# Patient Record
Sex: FEMALE | Race: WHITE | NOT HISPANIC OR LATINO | Employment: FULL TIME | URBAN - METROPOLITAN AREA
[De-identification: names, ages, dates, MRNs, and addresses within clinical notes are randomized per-mention and may not be internally consistent; named-entity substitution may affect disease eponyms.]

---

## 2017-09-07 ENCOUNTER — ALLSCRIPTS OFFICE VISIT (OUTPATIENT)
Dept: OTHER | Facility: OTHER | Age: 27
End: 2017-09-07

## 2017-09-07 PROCEDURE — G0145 SCR C/V CYTO,THINLAYER,RESCR: HCPCS | Performed by: FAMILY MEDICINE

## 2017-09-10 ENCOUNTER — LAB REQUISITION (OUTPATIENT)
Dept: LAB | Facility: HOSPITAL | Age: 27
End: 2017-09-10
Payer: COMMERCIAL

## 2017-09-10 DIAGNOSIS — Z11.3 ENCOUNTER FOR SCREENING FOR INFECTIONS WITH PREDOMINANTLY SEXUAL MODE OF TRANSMISSION: ICD-10-CM

## 2017-09-10 PROCEDURE — 87491 CHLMYD TRACH DNA AMP PROBE: CPT | Performed by: FAMILY MEDICINE

## 2017-09-10 PROCEDURE — 87591 N.GONORRHOEAE DNA AMP PROB: CPT | Performed by: FAMILY MEDICINE

## 2017-09-11 LAB
CHLAMYDIA DNA CVX QL NAA+PROBE: NORMAL
N GONORRHOEA DNA GENITAL QL NAA+PROBE: NORMAL

## 2017-09-13 LAB
LAB AP GYN PRIMARY INTERPRETATION: NORMAL
Lab: NORMAL
PATH INTERP SPEC-IMP: NORMAL

## 2018-01-12 NOTE — PROGRESS NOTES
Assessment    1  Visit for routine gyn exam (V72 31) (Z01 419)   2  Oral contraceptive prescribed (V25 01) (Z30 011)    Plan  Health Maintenance    · Begin or continue regular aerobic exercise  Gradually work up to at least 3 sessions of 30  minutes of exercise a week ; Status:Complete;   Done: [id number]   · We recommend that you bring your body mass index down to 26 ; Status:Complete;    Done: 81QRM3873    Check Pap smear, GC Chlamydia  Refill OC's  Discussion/Summary  Advice and education were given regarding aerobic exercise and calcium supplements  Gyn exam-Has a history of ASCUS on Pap  OPC's-tolerating well; wishes to continue  History of Present Illness  HM, Adult Female: The patient is being seen for a gynecology evaluation  General Health: The patient's health since the last visit is described as good  Lifestyle:  She consumes a diverse and healthy diet  (Eating pescatarian diet  Fish, seafood, fruits and vegetables  Dairy 3 daily  No caffeine but takes caffein epills  )   She has weight concerns  She exercises regularly  (Goes to the gym 3-5 times a week)   She does not use tobacco  She consumes alcohol  She reports occasional alcohol use  Reproductive health:  she reports abnormal menses  (Menses are regular  No spotting  No BTB  Taking OC's Wants to continue)   she is sexually active  Screening:      Review of Systems    Genitourinary: no dysuria, no pelvic pain, no vaginal discharge and no unexplained vaginal bleeding  Active Problems    1  Belching (787 3) (R14 2)   2  Chlamydial Disease (078 88)   3  Increased frequency of headaches (784 0) (R51)   4  Lipid screening (V77 91) (Z13 220)   5  Need for diphtheria-tetanus-pertussis (Tdap) vaccine (V06 1) (Z23)   6  Oral contraceptive prescribed (V25 01) (Z30 011)   7  Pap smear abnormality of cervix with ASCUS favoring benign (795 01) (R87 610)   8  Screen for STD (sexually transmitted disease) (V74 5) (Z11 3)   9   Screening for thyroid disorder (V77 0) (Z13 29)   10  Urinary tract infection (599 0) (N39 0)   11  Visit for routine gyn exam (V72 31) (Z01 419)    Past Medical History    · History of Denial Of Any Significant Medical History   · Oral contraceptive prescribed (V25 01) (Z30 011)    Surgical History    · History of Oral Surgery Tooth Extraction    Family History  Mother    · Family history of Hypertension (V17 49)  Father    · Family history of Diabetes Mellitus (V18 0)   · Family history of Hyperlipidemia  Family History    · Family history of Family Health Status Of Father - Alive   · Family history of Family Health Status Of Mother - Alive    Social History    · Being A Social Drinker   · Never A Smoker    Current Meds   1  Low-Ogestrel 0 3-30 MG-MCG Oral Tablet; TAKE 1 TABLET EVERY DAY FOR 21 DAYS,   THEN 7 TABLET-FREE DAYS; Therapy: [id number] to (Evaluate:04Oct2017)  Requested for: 21Tza0765; Last   Rx:01Cog6328; Status: ACTIVE - Retrospective Authorization Ordered   2  Nitrofurantoin Monohyd Macro 100 MG Oral Capsule; take one capsule daily as directed   on a prn basis; Therapy: 52TKR9344 to (Evaluate:19Bkk6974)  Requested for: 51TNC7461; Last   EV:77WOF6790 Ordered    Allergies    1   No Known Drug Allergies    Vitals   Recorded: 07Sep2017 06:56PM   Heart Rate 90   Respiration 14   Systolic 719 mm Hg   Diastolic 72 mm Hg   Height 5 ft 1 5 in   Weight 165 lb 2 oz   BMI Calculated 30 7 kg/m2   BSA Calculated 1 75 m2   LMP [id number]     Signatures   Electronically signed by : STEFFANIE Silvestre ; Sep  7 2017  7:24PM EST                       (Author)

## 2018-01-13 VITALS
SYSTOLIC BLOOD PRESSURE: 124 MMHG | RESPIRATION RATE: 14 BRPM | DIASTOLIC BLOOD PRESSURE: 72 MMHG | HEART RATE: 90 BPM | BODY MASS INDEX: 30.39 KG/M2 | HEIGHT: 62 IN | WEIGHT: 165.13 LBS

## 2018-01-13 NOTE — PROGRESS NOTES
Assessment    1  Oral contraceptive prescribed (V25 01) (Z30 011)   2  Visit for routine gyn exam (V72 31) (Z01 419)   3  Encounter for preventive health examination (V70 0) (Z00 00)   4  Screen for STD (sexually transmitted disease) (V74 5) (Z11 3)   5  Urinary tract infection (599 0) (N39 0)   · reccurent    Plan  Screen for STD (sexually transmitted disease)    · *VB-Depression Screening; Status:Complete;   Done: 39FQM4707 06:36PM  Screen for STD (sexually transmitted disease), Visit for routine gyn exam    · (Q) THINPREP TIS PAP RFX HR HPV DNA AND C  TRACHOMATIS AND N   GONORRHOEAE; Status:Active - Retrospective By Protocol Authorization; Requested  for:[id number];   PAP: : 8/20/15  : 8/1/16    Check Pap smear, BW  Discussion/Summary    1  Gyn exam  2  OC use-doing well and wishes to continue  History of Present Illness  HM, Adult Female: The patient is being seen for a gynecology evaluation  General Health: The patient's health since the last visit is described as good  She has regular dental visits  Lifestyle:  She consumes a diverse and healthy diet  (Eating a pescatarian diet  Eats vegetables, fruits and seafood  Dietary calcium several daily  )   She does not have any weight concerns  She exercises regularly  (Goes to the gym 5 days a week  )   She does not use tobacco  She consumes alcohol  She reports occasional alcohol use  Caffeine occ caffeine pills  Reproductive health:  she reports abnormal menses  (Menses are monthly and getting lighter  No spotting  )   she uses contraception  (Takes LoOgestrel  No side effects  Migraines resolved  Wants to continue  )  Screening:   HPI: Cortney Hoyt is now living in [address] [place] with her boyfriend  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 1 ) Little interest or pleasure in doing things? Not at all    2 ) Feeling down, depressed or hopeless? Not at all    3 ) Trouble falling asleep or sleeping too much? Not at all    4 ) Feeling tired or having little energy? Not at all    5 ) Poor appetite or overeating? Not at all    6 ) Feeling bad about yourself, or that you are a failure, or have let yourself or your family down? Not at all    7 ) Trouble concentrating on things, such as reading a newspaper or watching television? Not at all    8 ) Moving or speaking so slowly that other people could have noticed, or the opposite, moving or speaking faster than usual? Not at all    9 ) Thoughts that you would be better off dead or of hurting yourself in some way? Not at all  Active Problems    1  Belching (787 3) (R14 2)   2  Chlamydial Disease (078 88)   3  Increased frequency of headaches (784 0) (R51)   4  Need for diphtheria-tetanus-pertussis (Tdap) vaccine (V06 1) (Z23)   5  Oral contraceptive prescribed (V25 01) (Z30 011)   6  Pap smear abnormality of cervix with ASCUS favoring benign (795 01) (R87 610)   7  Screen for STD (sexually transmitted disease) (V74 5) (Z11 3)   8  Urinary tract infection (599 0) (N39 0)   9   Visit for routine gyn exam (V72 31) (Z01 419)    Past Medical History    · History of Denial Of Any Significant Medical History   · Oral contraceptive prescribed (V25 01) (Z30 011)    Surgical History    · History of Oral Surgery Tooth Extraction    Family History  Mother    · Family history of Hypertension (V17 49)  Father    · Family history of Diabetes Mellitus (V18 0)   · Family history of Hyperlipidemia  Family History    · Family history of Family Health Status Of Father - Alive   · Family history of Family Health Status Of Mother - Alive    Social History    · Being A Social Drinker   · Never A Smoker    Current Meds   1  Low-Ogestrel 0 3-30 MG-MCG Oral Tablet; TAKE 1 TABLET DAILY FOR 21 DAYS, THEN   7 TABLET-FREE DAYS; REPEAT; Therapy: 60IEZ8908 to ([facility])  Requested for: 23MSH4927; Last   Rx:07Jun2016 Ordered   2  Nitrofurantoin Monohyd Macro 100 MG Oral Capsule; take one capsule daily as directed   on a prn basis; Therapy: 46NNY7367 to (Evaluate:29Jun2016)  Requested for: 32NAJ1184; Last   Rx:31Mar2016 Ordered    Allergies    1  No Known Drug Allergies    Vitals   Recorded: 97NDM3279 70:70AP   Systolic 424   Diastolic 80   Heart Rate 98   Respiration 16   LMP 01-Aug-2016   Height 5 ft 1 5 in   Weight 156 lb 2 08 oz   BMI Calculated 29 02   BSA Calculated 1 71     Physical Exam    Constitutional   General appearance: No acute distress, well appearing and well nourished  Neck   Neck: Supple, symmetric, trachea midline, no masses  Thyroid: Normal, no thyromegaly  Pulmonary   Respiratory effort: No increased work of breathing or signs of respiratory distress  Auscultation of lungs: Clear to auscultation  Cardiovascular   Auscultation of heart: Normal rate and rhythm, normal S1 and S2, no murmurs  Carotid pulses: 2+ bilaterally  Abdominal aorta: Normal     Femoral pulses: 2+ bilaterally  Pedal pulses: 2+ bilaterally  Peripheral vascular exam: Normal     Examination of extremities for edema and/or varicosities: Normal     Genitourinary   External genitalia and vagina: Normal, no lesions appreciated  Urethra: Normal, no discharge  Cervix: Normal, no lesions, Pap obtained  Uterus: Normal size, no tenderness, no masses  Adnexa/Parametria: Normal, no masses or tenderness  Lymphatic   Palpation of lymph nodes in neck: No lymphadenopathy  Palpation of lymph nodes in axillae: No lymphadenopathy  Palpation of lymph nodes in groin: No lymphadenopathy  Results/Data  *VB-Depression Screening 95Aog7665 06:36PM Tyrone Henson     Test Name Result Flag Reference   Depression Scale Result      Depression Screen - Negative For Symptoms       Future Appointments    Date/Time Provider Specialty Site   09/07/2017 06:30 PM STEFFANIE Viramontes   [facility]     Signatures   Electronically signed by : STEFFANIE Young ; Aug 25 2016 11:00PM EST                       (Author)

## 2018-09-13 ENCOUNTER — OFFICE VISIT (OUTPATIENT)
Dept: FAMILY MEDICINE CLINIC | Facility: MEDICAL CENTER | Age: 28
End: 2018-09-13
Payer: COMMERCIAL

## 2018-09-13 VITALS
HEIGHT: 62 IN | DIASTOLIC BLOOD PRESSURE: 80 MMHG | SYSTOLIC BLOOD PRESSURE: 120 MMHG | RESPIRATION RATE: 16 BRPM | BODY MASS INDEX: 30.59 KG/M2 | WEIGHT: 166.2 LBS | HEART RATE: 80 BPM

## 2018-09-13 DIAGNOSIS — Z11.3 SCREENING EXAMINATION FOR STD (SEXUALLY TRANSMITTED DISEASE): ICD-10-CM

## 2018-09-13 DIAGNOSIS — R87.610 ASCUS OF CERVIX WITH NEGATIVE HIGH RISK HPV: Primary | ICD-10-CM

## 2018-09-13 PROCEDURE — 87491 CHLMYD TRACH DNA AMP PROBE: CPT | Performed by: FAMILY MEDICINE

## 2018-09-13 PROCEDURE — 87591 N.GONORRHOEAE DNA AMP PROB: CPT | Performed by: FAMILY MEDICINE

## 2018-09-13 PROCEDURE — 99395 PREV VISIT EST AGE 18-39: CPT | Performed by: FAMILY MEDICINE

## 2018-09-13 PROCEDURE — G0145 SCR C/V CYTO,THINLAYER,RESCR: HCPCS | Performed by: FAMILY MEDICINE

## 2018-09-13 NOTE — PROGRESS NOTES
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[place] [address]    Gyn-Doing well with OC's  Wants to continue  Menses monthly, regular , lighter  Occ sexually active  No vaginal discharge, pain, burning    O: /80 (Cuff Size: Standard)   Pulse 80   Resp 16   Ht 5' 1 5" (1 562 m)   Wt 75 4 kg (166 lb 3 2 oz)   BMI 30 89 kg/m²   Physical Exam   Constitutional: She appears well-developed and well-nourished  No distress  Neck: Carotid bruit is not present  No thyromegaly present  Cardiovascular: Normal rate, regular rhythm, normal heart sounds and intact distal pulses  Pulmonary/Chest: Effort normal and breath sounds normal    Abdominal: Soft  Bowel sounds are normal  She exhibits no mass  There is no hepatomegaly  There is no tenderness  Musculoskeletal: She exhibits no edema  Lymphadenopathy:     She has no cervical adenopathy  Breasts no masses or discharge  External genitalia normal  Vagina normal  Cervix normal no lesions; friable  Uterus normal size shape and consistency  Adnexae non palpable  Rectal exam no masses stool  heme negative    Assessment  1  Normal gyn exam  2  History of ASCUS on Pap smear  3  Oral contraceptive use-doing well ;wishes to continue  Plan  Check Pap smear  GC chlamydia    Recheck 1 year

## 2018-09-14 LAB
CHLAMYDIA DNA CVX QL NAA+PROBE: NORMAL
N GONORRHOEA DNA GENITAL QL NAA+PROBE: NORMAL

## 2018-09-18 LAB
LAB AP GYN PRIMARY INTERPRETATION: NORMAL
LAB AP LMP: NORMAL
Lab: NORMAL

## 2018-09-20 ENCOUNTER — TELEPHONE (OUTPATIENT)
Dept: FAMILY MEDICINE CLINIC | Facility: MEDICAL CENTER | Age: 28
End: 2018-09-20

## 2018-09-20 NOTE — TELEPHONE ENCOUNTER
----- Message from Hoda Rivers MD sent at 9/20/2018  9:39 AM EDT -----  Notify Pap normal GC chlamydia negative    Repeat 1 year

## 2018-10-19 DIAGNOSIS — Z30.9 ENCOUNTER FOR CONTRACEPTIVE MANAGEMENT, UNSPECIFIED TYPE: Primary | ICD-10-CM

## 2018-10-19 RX ORDER — NORGESTREL AND ETHINYL ESTRADIOL 0.3-0.03MG
1 KIT ORAL DAILY
Qty: 84 TABLET | Refills: 3 | Status: SHIPPED | OUTPATIENT
Start: 2018-10-19 | End: 2019-08-28 | Stop reason: SDUPTHER

## 2019-08-28 DIAGNOSIS — Z30.9 ENCOUNTER FOR CONTRACEPTIVE MANAGEMENT, UNSPECIFIED TYPE: ICD-10-CM

## 2019-08-29 RX ORDER — NORGESTREL AND ETHINYL ESTRADIOL 0.3-0.03MG
1 KIT ORAL DAILY
Qty: 84 TABLET | Refills: 0 | Status: SHIPPED | OUTPATIENT
Start: 2019-08-29 | End: 2020-01-14

## 2019-09-19 ENCOUNTER — OFFICE VISIT (OUTPATIENT)
Dept: FAMILY MEDICINE CLINIC | Facility: MEDICAL CENTER | Age: 29
End: 2019-09-19
Payer: COMMERCIAL

## 2019-09-19 VITALS
HEIGHT: 62 IN | SYSTOLIC BLOOD PRESSURE: 134 MMHG | DIASTOLIC BLOOD PRESSURE: 76 MMHG | RESPIRATION RATE: 16 BRPM | BODY MASS INDEX: 31.65 KG/M2 | WEIGHT: 172 LBS | HEART RATE: 84 BPM

## 2019-09-19 DIAGNOSIS — Z11.3 SCREENING FOR STD (SEXUALLY TRANSMITTED DISEASE): ICD-10-CM

## 2019-09-19 DIAGNOSIS — Z01.419 ENCOUNTER FOR GYNECOLOGICAL EXAMINATION WITHOUT ABNORMAL FINDING: ICD-10-CM

## 2019-09-19 DIAGNOSIS — N39.0 URINARY TRACT INFECTION WITHOUT HEMATURIA, SITE UNSPECIFIED: ICD-10-CM

## 2019-09-19 DIAGNOSIS — Z12.4 CERVICAL CANCER SCREENING: ICD-10-CM

## 2019-09-19 DIAGNOSIS — Z13.220 SCREENING FOR LIPID DISORDERS: Primary | ICD-10-CM

## 2019-09-19 DIAGNOSIS — Z13.1 SCREENING FOR DIABETES MELLITUS: ICD-10-CM

## 2019-09-19 PROCEDURE — G0145 SCR C/V CYTO,THINLAYER,RESCR: HCPCS | Performed by: FAMILY MEDICINE

## 2019-09-19 PROCEDURE — S0612 ANNUAL GYNECOLOGICAL EXAMINA: HCPCS | Performed by: FAMILY MEDICINE

## 2019-09-19 PROCEDURE — 87624 HPV HI-RISK TYP POOLED RSLT: CPT | Performed by: FAMILY MEDICINE

## 2019-09-19 PROCEDURE — 3008F BODY MASS INDEX DOCD: CPT | Performed by: FAMILY MEDICINE

## 2019-09-19 PROCEDURE — 87491 CHLMYD TRACH DNA AMP PROBE: CPT | Performed by: FAMILY MEDICINE

## 2019-09-19 PROCEDURE — 87591 N.GONORRHOEAE DNA AMP PROB: CPT | Performed by: FAMILY MEDICINE

## 2019-09-19 NOTE — PROGRESS NOTES
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[place]; works as speech therapist      Gyn   Taking OC's  Doing well  Uses for contraception and heavy menses  Wishes to continue use  No spotting, headaches, nausea  Migraine once every few months   She requests refill of Macrobid prescription which she uses for pericoital urinary tract infections  She was originally given a prescription for this when she lived in [place]  It works well but she rarely uses it  The    O: /76 (Cuff Size: Standard)   Pulse 84   Resp 16   Ht 5' 2" (1 575 m)   Wt 78 kg (172 lb)   BMI 31 46 kg/m²   Physical Exam   Constitutional: She appears well-developed and well-nourished  No distress  Neck: Carotid bruit is not present  No thyromegaly present  Cardiovascular: Normal rate, regular rhythm, normal heart sounds and intact distal pulses  Pulmonary/Chest: Effort normal and breath sounds normal    Abdominal: Soft  Bowel sounds are normal  She exhibits no mass  There is no hepatomegaly  There is no tenderness  Musculoskeletal: She exhibits no edema  Lymphadenopathy:     She has no cervical adenopathy  Breasts no masses or discharge  External genitalia normal  Vagina normal  Cervix normal no lesions  Uterus normal size shape and consistency  Adnexae non palpable    Assessment  Oral contraceptive user-normal exam   No contraindications to use  Advised lipid profile screening in light of family history    Plan  Check Pap smear and HPV  GC chlamydia  Refill OCPs  Recheck 1 year

## 2019-09-20 RX ORDER — NITROFURANTOIN 25; 75 MG/1; MG/1
100 CAPSULE ORAL 2 TIMES DAILY
Qty: 20 CAPSULE | Refills: 0 | Status: SHIPPED | OUTPATIENT
Start: 2019-09-20 | End: 2020-01-03

## 2019-09-23 LAB
C TRACH DNA SPEC QL NAA+PROBE: NEGATIVE
HPV HR 12 DNA CVX QL NAA+PROBE: NEGATIVE
HPV16 DNA CVX QL NAA+PROBE: NEGATIVE
HPV18 DNA CVX QL NAA+PROBE: NEGATIVE
N GONORRHOEA DNA SPEC QL NAA+PROBE: NEGATIVE

## 2019-09-24 LAB
LAB AP GYN PRIMARY INTERPRETATION: NORMAL
Lab: NORMAL
PATH INTERP SPEC-IMP: NORMAL

## 2019-09-26 ENCOUNTER — TELEPHONE (OUTPATIENT)
Dept: FAMILY MEDICINE CLINIC | Facility: MEDICAL CENTER | Age: 29
End: 2019-09-26

## 2019-09-26 NOTE — TELEPHONE ENCOUNTER
----- Message from Hoda Rivers MD sent at 9/26/2019  9:53 AM EDT -----  Notify Pap smear normal and HPV negative  Did show some changes which could be consistent with a yeast infection or bacterial vaginosis  However if she does not have any symptoms in other words vaginal discharge odor etc no need for treatment

## 2019-10-16 LAB
CHOLEST SERPL-MCNC: 152 MG/DL (ref 100–199)
GLUCOSE P FAST SERPL-MCNC: 84 MG/DL (ref 65–99)
HDLC SERPL-MCNC: 40 MG/DL
LDLC SERPL CALC-MCNC: 77 MG/DL (ref 0–99)
SL AMB VLDL CHOLESTEROL CALC: 35 MG/DL (ref 5–40)
TRIGL SERPL-MCNC: 174 MG/DL (ref 0–149)

## 2020-01-02 DIAGNOSIS — N39.0 URINARY TRACT INFECTION WITHOUT HEMATURIA, SITE UNSPECIFIED: ICD-10-CM

## 2020-01-03 RX ORDER — NITROFURANTOIN 25; 75 MG/1; MG/1
CAPSULE ORAL
Qty: 20 CAPSULE | Refills: 0 | Status: SHIPPED | OUTPATIENT
Start: 2020-01-03 | End: 2020-02-11

## 2020-01-13 DIAGNOSIS — Z30.9 ENCOUNTER FOR CONTRACEPTIVE MANAGEMENT, UNSPECIFIED TYPE: ICD-10-CM

## 2020-01-14 RX ORDER — NORGESTREL AND ETHINYL ESTRADIOL 0.3-0.03MG
1 KIT ORAL DAILY
Qty: 84 TABLET | Refills: 0 | Status: SHIPPED | OUTPATIENT
Start: 2020-01-14 | End: 2020-04-10

## 2020-02-10 DIAGNOSIS — N39.0 URINARY TRACT INFECTION WITHOUT HEMATURIA, SITE UNSPECIFIED: ICD-10-CM

## 2020-02-11 RX ORDER — NITROFURANTOIN 25; 75 MG/1; MG/1
CAPSULE ORAL
Qty: 10 CAPSULE | Refills: 1 | Status: SHIPPED | OUTPATIENT
Start: 2020-02-11 | End: 2020-04-10

## 2020-04-09 DIAGNOSIS — N39.0 URINARY TRACT INFECTION WITHOUT HEMATURIA, SITE UNSPECIFIED: ICD-10-CM

## 2020-04-09 DIAGNOSIS — Z30.9 ENCOUNTER FOR CONTRACEPTIVE MANAGEMENT, UNSPECIFIED TYPE: ICD-10-CM

## 2020-04-10 RX ORDER — NITROFURANTOIN 25; 75 MG/1; MG/1
CAPSULE ORAL
Qty: 10 CAPSULE | Refills: 1 | Status: SHIPPED | OUTPATIENT
Start: 2020-04-10 | End: 2020-09-04 | Stop reason: SDUPTHER

## 2020-04-10 RX ORDER — NORGESTREL AND ETHINYL ESTRADIOL 0.3-0.03MG
1 KIT ORAL DAILY
Qty: 84 TABLET | Refills: 3 | Status: SHIPPED | OUTPATIENT
Start: 2020-04-10 | End: 2021-02-11

## 2020-08-17 ENCOUNTER — OFFICE VISIT (OUTPATIENT)
Dept: FAMILY MEDICINE CLINIC | Facility: MEDICAL CENTER | Age: 30
End: 2020-08-17
Payer: OTHER GOVERNMENT

## 2020-08-17 VITALS
HEART RATE: 88 BPM | HEIGHT: 62 IN | TEMPERATURE: 98.8 F | WEIGHT: 144.6 LBS | BODY MASS INDEX: 26.61 KG/M2 | DIASTOLIC BLOOD PRESSURE: 70 MMHG | RESPIRATION RATE: 16 BRPM | SYSTOLIC BLOOD PRESSURE: 120 MMHG

## 2020-08-17 DIAGNOSIS — K29.30 SUPERFICIAL GASTRITIS WITHOUT HEMORRHAGE, UNSPECIFIED CHRONICITY: Primary | ICD-10-CM

## 2020-08-17 PROCEDURE — 3008F BODY MASS INDEX DOCD: CPT | Performed by: FAMILY MEDICINE

## 2020-08-17 PROCEDURE — 99213 OFFICE O/P EST LOW 20 MIN: CPT | Performed by: FAMILY MEDICINE

## 2020-08-17 RX ORDER — OMEPRAZOLE 20 MG/1
20 CAPSULE, DELAYED RELEASE ORAL DAILY
Qty: 30 CAPSULE | Refills: 2 | Status: SHIPPED | OUTPATIENT
Start: 2020-08-17 | End: 2020-11-09

## 2020-08-17 NOTE — PROGRESS NOTES
Gary Maia is here for abdominal pain  Pain is epigastric  More on the left  Comes and goes  Related to eating  Occurs a few hours later  Also occurrs after takin g Excedrin  No nausea or vomiting  Doesn't wake from sleep  No diarrhea but greasy foods cause urgency  Otherwise feels fine  She got  a month ago  She is also starting a new job  Because she has been under more stress she has been taking more Excedrin and in the morning  Pain is overall better since she stopped taking Excedrin  No prior history of abdominal pain  She asks about going off oral contraceptives as she would like to get pregnant  O: /70 (Cuff Size: Standard)   Pulse 88   Temp 98 8 °F (37 1 °C)   Resp 16   Ht 5' 2" (1 575 m)   Wt 65 6 kg (144 lb 9 6 oz)   BMI 26 45 kg/m²   Neck no adenopathy thyromegaly bruits  Chest clear with good breath sounds  Cardiac regular rate without murmur  Abdomen slight LUQ tenderness otherwise no hepatosplenomegaly or masses    Assessment  1  NSAID induced gastritis-will have her stop all aspirin and NSAIDs  Reduce caffeine and alcohol intake  Elevate head of bed  Rx for omeprazole  If no better will need EGD referral  2  Oral contraceptive use-will discuss further at her gyn checkup next month    Plan  Rx for omeprazole  As above    Recheck 1 month

## 2020-08-25 DIAGNOSIS — N39.0 URINARY TRACT INFECTION WITHOUT HEMATURIA, SITE UNSPECIFIED: ICD-10-CM

## 2020-09-02 ENCOUNTER — TELEPHONE (OUTPATIENT)
Dept: FAMILY MEDICINE CLINIC | Facility: MEDICAL CENTER | Age: 30
End: 2020-09-02

## 2020-09-02 NOTE — TELEPHONE ENCOUNTER
Jose Clinton left a message that she is waiting for her refill of Macrobid  Is this a med you routinely refill

## 2020-09-04 DIAGNOSIS — N39.0 URINARY TRACT INFECTION WITHOUT HEMATURIA, SITE UNSPECIFIED: ICD-10-CM

## 2020-09-04 RX ORDER — NITROFURANTOIN 25; 75 MG/1; MG/1
100 CAPSULE ORAL 2 TIMES DAILY
Qty: 30 CAPSULE | Refills: 1 | Status: SHIPPED | OUTPATIENT
Start: 2020-09-04 | End: 2022-02-24

## 2020-09-07 RX ORDER — NITROFURANTOIN 25; 75 MG/1; MG/1
CAPSULE ORAL
Qty: 10 CAPSULE | Refills: 1 | OUTPATIENT
Start: 2020-09-07

## 2020-11-08 DIAGNOSIS — K29.30 SUPERFICIAL GASTRITIS WITHOUT HEMORRHAGE, UNSPECIFIED CHRONICITY: ICD-10-CM

## 2020-11-09 RX ORDER — OMEPRAZOLE 20 MG/1
CAPSULE, DELAYED RELEASE ORAL
Qty: 30 CAPSULE | Refills: 2 | Status: SHIPPED | OUTPATIENT
Start: 2020-11-09 | End: 2022-02-24

## 2020-12-10 ENCOUNTER — OFFICE VISIT (OUTPATIENT)
Dept: FAMILY MEDICINE CLINIC | Facility: MEDICAL CENTER | Age: 30
End: 2020-12-10
Payer: OTHER GOVERNMENT

## 2020-12-10 VITALS
BODY MASS INDEX: 26.36 KG/M2 | RESPIRATION RATE: 16 BRPM | SYSTOLIC BLOOD PRESSURE: 120 MMHG | HEIGHT: 62 IN | TEMPERATURE: 98.2 F | WEIGHT: 143.23 LBS | DIASTOLIC BLOOD PRESSURE: 80 MMHG | HEART RATE: 76 BPM

## 2020-12-10 DIAGNOSIS — Z01.419 ENCOUNTER FOR GYNECOLOGICAL EXAMINATION WITHOUT ABNORMAL FINDING: ICD-10-CM

## 2020-12-10 DIAGNOSIS — Z30.9 ENCOUNTER FOR CONTRACEPTIVE MANAGEMENT, UNSPECIFIED TYPE: ICD-10-CM

## 2020-12-10 DIAGNOSIS — Z00.00 ROUTINE ADULT HEALTH MAINTENANCE: ICD-10-CM

## 2020-12-10 DIAGNOSIS — M54.50 LOW BACK PAIN, UNSPECIFIED BACK PAIN LATERALITY, UNSPECIFIED CHRONICITY, UNSPECIFIED WHETHER SCIATICA PRESENT: Primary | ICD-10-CM

## 2020-12-10 DIAGNOSIS — K29.30 SUPERFICIAL GASTRITIS WITHOUT HEMORRHAGE, UNSPECIFIED CHRONICITY: ICD-10-CM

## 2020-12-10 PROCEDURE — 99395 PREV VISIT EST AGE 18-39: CPT | Performed by: FAMILY MEDICINE

## 2021-01-03 LAB
ALBUMIN SERPL-MCNC: 4.4 G/DL (ref 3.6–5.1)
ALBUMIN/GLOB SERPL: 1.6 (CALC) (ref 1–2.5)
ALP SERPL-CCNC: 58 U/L (ref 31–125)
ALT SERPL-CCNC: 15 U/L (ref 6–29)
APPEARANCE UR: CLEAR
AST SERPL-CCNC: 15 U/L (ref 10–30)
BILIRUB SERPL-MCNC: 0.4 MG/DL (ref 0.2–1.2)
BILIRUB UR QL STRIP: NEGATIVE
BUN SERPL-MCNC: 10 MG/DL (ref 7–25)
BUN/CREAT SERPL: NORMAL (CALC) (ref 6–22)
CALCIUM SERPL-MCNC: 8.8 MG/DL (ref 8.6–10.2)
CHLORIDE SERPL-SCNC: 105 MMOL/L (ref 98–110)
CO2 SERPL-SCNC: 24 MMOL/L (ref 20–32)
COLOR UR: YELLOW
CREAT SERPL-MCNC: 0.64 MG/DL (ref 0.5–1.1)
GLOBULIN SER CALC-MCNC: 2.8 G/DL (CALC) (ref 1.9–3.7)
GLUCOSE SERPL-MCNC: 80 MG/DL (ref 65–99)
GLUCOSE UR QL STRIP: NEGATIVE
HGB UR QL STRIP: NEGATIVE
KETONES UR QL STRIP: NEGATIVE
LEUKOCYTE ESTERASE UR QL STRIP: NEGATIVE
NITRITE UR QL STRIP: NEGATIVE
PH UR STRIP: 6 [PH] (ref 5–8)
POTASSIUM SERPL-SCNC: 4.4 MMOL/L (ref 3.5–5.3)
PROT SERPL-MCNC: 7.2 G/DL (ref 6.1–8.1)
PROT UR QL STRIP: NEGATIVE
SL AMB EGFR AFRICAN AMERICAN: 138 ML/MIN/1.73M2
SL AMB EGFR NON AFRICAN AMERICAN: 119 ML/MIN/1.73M2
SODIUM SERPL-SCNC: 138 MMOL/L (ref 135–146)
SP GR UR STRIP: 1.02 (ref 1–1.03)

## 2021-02-10 DIAGNOSIS — Z30.9 ENCOUNTER FOR CONTRACEPTIVE MANAGEMENT, UNSPECIFIED TYPE: ICD-10-CM

## 2021-02-11 RX ORDER — NORGESTREL AND ETHINYL ESTRADIOL 0.3-0.03MG
1 KIT ORAL DAILY
Qty: 84 TABLET | Refills: 3 | Status: SHIPPED | OUTPATIENT
Start: 2021-02-11 | End: 2022-02-24

## 2021-12-16 ENCOUNTER — OFFICE VISIT (OUTPATIENT)
Dept: FAMILY MEDICINE CLINIC | Facility: MEDICAL CENTER | Age: 31
End: 2021-12-16
Payer: OTHER GOVERNMENT

## 2021-12-16 VITALS
TEMPERATURE: 97.6 F | DIASTOLIC BLOOD PRESSURE: 78 MMHG | WEIGHT: 156.2 LBS | HEART RATE: 86 BPM | BODY MASS INDEX: 28.74 KG/M2 | SYSTOLIC BLOOD PRESSURE: 118 MMHG | HEIGHT: 62 IN | RESPIRATION RATE: 16 BRPM

## 2021-12-16 DIAGNOSIS — Z01.419 ENCOUNTER FOR GYNECOLOGICAL EXAMINATION WITHOUT ABNORMAL FINDING: ICD-10-CM

## 2021-12-16 DIAGNOSIS — N91.2 AMENORRHEA: Primary | ICD-10-CM

## 2021-12-16 PROCEDURE — S0612 ANNUAL GYNECOLOGICAL EXAMINA: HCPCS | Performed by: FAMILY MEDICINE

## 2022-02-24 ENCOUNTER — TELEMEDICINE (OUTPATIENT)
Dept: PERINATAL CARE | Facility: CLINIC | Age: 32
End: 2022-02-24
Payer: OTHER GOVERNMENT

## 2022-02-24 VITALS — BODY MASS INDEX: 27.05 KG/M2 | HEIGHT: 62 IN | WEIGHT: 147 LBS

## 2022-02-24 DIAGNOSIS — R79.89 HIGH ANTI-MULLERIAN HORMONE: ICD-10-CM

## 2022-02-24 DIAGNOSIS — Z31.69 ENCOUNTER FOR PRECONCEPTION CONSULTATION: Primary | ICD-10-CM

## 2022-02-24 PROCEDURE — 99204 OFFICE O/P NEW MOD 45 MIN: CPT | Performed by: OBSTETRICS & GYNECOLOGY

## 2022-02-24 NOTE — LETTER
February 24, 2022     Josse Lofton MD  [address] [facility] [address]    Patient: Renate Phan   YOB: 1990   Date of Visit: 2/24/2022       Dear Dr Karli Solano: Thank you for referring Renate Phan to me for evaluation  Below are my notes for this consultation  I am connecting her with a caring reproductive endocrinologist and also an OB/GYN  If you have questions, please do not hesitate to call me  Sincerely,    Christiano Bradford MD  Maternal-Fetal Medicine          Lizandro Joe MD  2/24/2022  9:54 AM  Sign when Signing Visit  PRECONCEPTION CONSULTATION: MATERNAL-FETAL MEDICINE     Virtual Regular Visit    Verification of patient location: Renate Phan is located in the [facility]  The patient was identified by name and date of birth  She was informed that this is a telemedicine visit and that the visit is being conducted through [facility] and patient was informed this is a secure, HIPAA-complaint platform  She agrees to proceed     My office door was closed  No one else was in the room  She acknowledged consent and understanding of privacy and security of the video platform  The patient has agreed to participate and understands they can discontinue the visit at any time  Patient is aware this is a billable service  Assessment and Plan: Ms Robert Oshea is a 34y o  year-old para No obstetric history on file  here for preconception counseling  By issue:    Problem List Items Addressed This Visit        Other    Encounter for preconception consultation - Primary     Chiquis Givens presents today for counseling and discussion after labwork was done in the fall by an outside gynecologist   Results are in her chart  These appear to be a workup for infertility  She was counseled that her high AMH level meant she would not be able to get pregnant  This sounds to have been a difficult experience for her    Dr Karli Solano her PCP, by whom she has been cared for for the past 25 years, will be retiring and she will also no longer have a PCP soon  I reviewed with her the results of the CMP, A1C, and TSH which are normal   Reviewed that the others are typically not labs that I order (rather these are ordered and interpreted classically by a reproductive endocrinologist as part of an infertility workup) though I note her high AMH level  [facility], which she accepted and is instructed to call to set this up  Zahida Elias lives in [facility] but has not had much success finding a provider with whom she is comfortable locally  Also offered to connect her with [facility] office which is located in [place]  Explained that I was deferring the interpretation and management of her labs to PATRICIA but that I could certainly facilitate her getting there  Encouraged her to reach out with any other questions or concerns along the way  To continue prenatal vitamins  Would be OK to continue Zoloft during pregnancy  Other Visit Diagnoses     High anti-Mullerian hormone        Relevant Orders    Ambulatory referral to Infertility          Referring physician:   Jennifer Gee Md  [facility],  [address]    Dear Dr Russel Tolbert,    Thank you for referring patient Seng Hahn for preconception Maternal-Fetal Medicine consultation  As you know, Ms Amy Dudley is a 34y o  year-old     Her history is as follows:    Past Medical History:   Diagnosis Date    Depression     started therapy a year ago; now with a psychiatrist    Gastritis     related to OTC pain medications, diagnosed after upper endoscopy    Recurrent UTI        Past Medical History Pertinent Negatives:   Diagnosis Date Noted    Deep vein thrombosis ([facility] ) 2022    Heart disease 2022    Heart failure ([facility] ) 2022    Heart valve disease 2022    Hemophilia ([facility] ) 2022    Hepatitis B 2022    Hepatitis C 2022    History of transfusion 2022    HIV [facility] 2022    Kidney stone 2022    Liver disease 2022    Myocardial infarction ([facility] ) 2022    Pulmonary embolism ([facility] ) 2022    Von Willebrand disease ([facility] ) 2022       Past Surgical History:   Procedure Laterality Date    TOOTH EXTRACTION         Past Surgical History Pertinent Negatives:   Procedure Date Noted    BARIATRIC SURGERY 2022     SECTION 2022       OB History    Para Term  AB Living   0 0 0 0 0 0   SAB IAB Ectopic Multiple Live Births   0 0 0 0 0       Gynecologic history: Patient's last menstrual period was 2022 (exact date)  Social History     Tobacco Use    Smoking status: Never Smoker    Smokeless tobacco: Never Used   Substance Use Topics    Alcohol use: Yes     Comment: Social    Drug use: Never       Family History   Problem Relation Age of Onset    Hypertension Mother     Diabetes Father     Hyperlipidemia Father        Family history per our office screening tool diabetes and hypertension in her father, mother with hypertension possibly in pregnancy, also her father with alcoholism is negative for Ashkenazi Confucianist ancestry, birth defects, blood clot in legs or lungs, early-onset breast ovarian or colon cancer; Down syndrome or other chromosome problem, genetic condition or carrier state for cystic fibrosis, sickle cell, thalassemia, Mark-Sachs, or spinal muscular atrophy, von Willebrand's disease  Maternal grandmother was 3 of 8 and 7 of the siblings passed away from hemophilia; Alem's mother was tested and so was her maternal grandmother and they do not carry this gene        Current Outpatient Medications:     sertraline (ZOLOFT) 50 mg tablet, Take 50 mg by mouth daily, Disp: , Rfl:     Prenatal Vit-DSS-Fe Cbn-FA (PRENATAL AD PO), Take by mouth, Disp: , Rfl:     No Known Allergies    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  Spouse/Partner Name: Brian Perez; Age 44; some cardiac issues; occupation: [employer] manager, [employer]  Vitals: Height 5' 2" (1 575 m), weight 66 7 kg (147 lb), last menstrual period 02/04/2022, not currently breastfeeding  Physical Exam  Constitutional:       General: She is not in acute distress  Appearance: Normal appearance  She is not ill-appearing, toxic-appearing or diaphoretic  HENT:      Head: Normocephalic and atraumatic  Nose: No congestion or rhinorrhea  Eyes:      General: No scleral icterus  Right eye: No discharge  Left eye: No discharge  Extraocular Movements: Extraocular movements intact  Conjunctiva/sclera: Conjunctivae normal    Pulmonary:      Effort: Pulmonary effort is normal  No respiratory distress  Musculoskeletal:      Cervical back: Normal range of motion  Skin:     Coloration: Skin is not jaundiced or pale  Findings: No erythema, lesion or rash  Neurological:      General: No focal deficit present  Mental Status: She is alert and oriented to person, place, and time  Psychiatric:         Mood and Affect: Mood normal          Behavior: Behavior normal          -------------------------    The total time spent on this new patient on the encounter date was 53 minutes  This time included previsit service time of 5 minutes reviewing records and precharting, face-to-face (via virtual platform) service time of  36 minutes counseling regarding results and coordinating care, and post-service time of  12 minutes charting  At the conclusion of today's encounter, all questions were answered to her satisfaction  Thank you very much for this kind referral and please do not hesitate to contact me with any further questions or concerns      Sincerely,    Cary Herbert MD  Attending Physician, [place]    VIRTUAL VISIT DISCLAIMER      Jessica Cr verbally agrees to participate in [employer]  Patient is aware that [employer] could be limited without vital signs or the ability to perform a full hands-on physical exam  Melissa Trujillo understands she or the provider may request at any time to terminate the video visit and request the patient to seek care or treatment in person

## 2022-02-24 NOTE — PROGRESS NOTES
PRECONCEPTION CONSULTATION: MATERNAL-FETAL MEDICINE     Virtual Regular Visit    Verification of patient location: Jaime Knowles is located in the [facility]  The patient was identified by name and date of birth  She was informed that this is a telemedicine visit and that the visit is being conducted through [address] and patient was informed this is a secure, HIPAA-complaint platform  She agrees to proceed     My office door was closed  No one else was in the room  She acknowledged consent and understanding of privacy and security of the video platform  The patient has agreed to participate and understands they can discontinue the visit at any time  Patient is aware this is a billable service  Assessment and Plan: Ms Solange Montano is a 34y o  year-old para No obstetric history on file  here for preconception counseling  By issue:    Problem List Items Addressed This Visit        Other    Encounter for preconception consultation - Primary     Gary Carvalho presents today for counseling and discussion after labwork was done in the fall by an outside gynecologist   Results are in her chart  These appear to be a workup for infertility  She was counseled that her high AMH level meant she would not be able to get pregnant  This sounds to have been a difficult experience for her  Dr Amelia Noe her PCP, by whom she has been cared for for the past 25 years, will be retiring and she will also no longer have a PCP soon  I reviewed with her the results of the CMP, A1C, and TSH which are normal   Reviewed that the others are typically not labs that I order (rather these are ordered and interpreted classically by a reproductive endocrinologist as part of an infertility workup) though I note her high AMH level  I offered a referral to a [facility] physician Dr Shannan Isabel, which she accepted and is instructed to call to set this up    Gary Carvalho lives in [facility] but has not had much success finding a provider with whom she is comfortable locally  Also offered to connect her with [facility] office which is located in [place]  Explained that I was deferring the interpretation and management of her labs to PATRICIA but that I could certainly facilitate her getting there  Encouraged her to reach out with any other questions or concerns along the way  To continue prenatal vitamins  Would be OK to continue Zoloft during pregnancy  Other Visit Diagnoses     High anti-Mullerian hormone        Relevant Orders    Ambulatory referral to Infertility          Referring physician:   Jennifre Gee Md  [address] [facility],  [address]    Dear Dr Russel Tolbert,    Thank you for referring patient Seng Hahn for preconception Maternal-Fetal Medicine consultation  As you know, Ms mAy Dudley is a 34y o  year-old     Her history is as follows:    Past Medical History:   Diagnosis Date    Depression     started therapy a year ago; now with a psychiatrist    Gastritis     related to OTC pain medications, diagnosed after upper endoscopy    Recurrent UTI        Past Medical History Pertinent Negatives:   Diagnosis Date Noted    Deep vein thrombosis ([facility] [address] ) 2022    Heart disease 2022    Heart failure ([facility] [address] [facility] ) 2022    Heart valve disease 2022    Hemophilia ([facility] [address] ) 2022    Hepatitis B 2022    Hepatitis C 2022    History of transfusion 2022    HIV disease ([facility] [address] ) 2022    Kidney stone 2022    Liver disease 2022    Myocardial infarction ([facility] [address] ) 2022    Pulmonary embolism ([facility] [address] ) 2022    Von Willebrand disease ([facility] [address] ) 2022       Past Surgical History:   Procedure Laterality Date    TOOTH EXTRACTION         Past Surgical History Pertinent Negatives:   Procedure Date Noted    BARIATRIC SURGERY 2022     SECTION 2022       OB History    Para Term  AB Living   0 0 0 0 0 0   SAB IAB Ectopic Multiple Live Births   0 0 0 0 0       Gynecologic history: Patient's last menstrual period was 02/04/2022 (exact date)  Social History     Tobacco Use    Smoking status: Never Smoker    Smokeless tobacco: Never Used   Substance Use Topics    Alcohol use: Yes     Comment: Social    Drug use: Never       Family History   Problem Relation Age of Onset    Hypertension Mother     Diabetes Father     Hyperlipidemia Father        Family history per our office screening tool diabetes and hypertension in her father, mother with hypertension possibly in pregnancy, also her father with alcoholism is negative for Ashkenazi Catholic ancestry, birth defects, blood clot in legs or lungs, early-onset breast ovarian or colon cancer; Down syndrome or other chromosome problem, genetic condition or carrier state for cystic fibrosis, sickle cell, thalassemia, Mark-Sachs, or spinal muscular atrophy, von Willebrand's disease  Maternal grandmother was 3 of 8 and 7 of the siblings passed away from hemophilia; Alem's mother was tested and so was her maternal grandmother and they do not carry this gene  Current Outpatient Medications:     sertraline (ZOLOFT) 50 mg tablet, Take 50 mg by mouth daily, Disp: , Rfl:     Prenatal Vit-DSS-Fe Cbn-FA (PRENATAL AD PO), Take by mouth, Disp: , Rfl:     No Known Allergies    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  Spouse/Partner Name: Arjun Jack; Age 44; some cardiac issues; occupation: [employer] manager, [address] [facility] [employer] [facility] [employer]  Vitals: Height 5' 2" (1 575 m), weight 66 7 kg (147 lb), last menstrual period 02/04/2022, not currently breastfeeding  Physical Exam  Constitutional:       General: She is not in acute distress  Appearance: Normal appearance  She is not ill-appearing, toxic-appearing or diaphoretic  HENT:      Head: Normocephalic and atraumatic  Nose: No congestion or rhinorrhea  Eyes:      General: No scleral icterus  Right eye: No discharge  Left eye: No discharge  Extraocular Movements: Extraocular movements intact  Conjunctiva/sclera: Conjunctivae normal    Pulmonary:      Effort: Pulmonary effort is normal  No respiratory distress  Musculoskeletal:      Cervical back: Normal range of motion  Skin:     Coloration: Skin is not jaundiced or pale  Findings: No erythema, lesion or rash  Neurological:      General: No focal deficit present  Mental Status: She is alert and oriented to person, place, and time  Psychiatric:         Mood and Affect: Mood normal          Behavior: Behavior normal          -------------------------    The total time spent on this new patient on the encounter date was 53 minutes  This time included previsit service time of 5 minutes reviewing records and precharting, face-to-face (via virtual platform) service time of  36 minutes counseling regarding results and coordinating care, and post-service time of  12 minutes charting  At the conclusion of today's encounter, all questions were answered to her satisfaction  Thank you very much for this kind referral and please do not hesitate to contact me with any further questions or concerns  Sincerely,    Luis Carrillo MD  Attending Physician, [facility]    VIRTUAL VISIT DISCLAIMER      Housestephen Vang verbally agrees to participate in [employer]  Patient is aware that [employer] could be limited without vital signs or the ability to perform a full hands-on physical exam  Harsh Vang understands she or the provider may request at any time to terminate the video visit and request the patient to seek care or treatment in person

## 2022-02-24 NOTE — ASSESSMENT & PLAN NOTE
Kwasi presents today for counseling and discussion after labwork was done in the fall by an outside gynecologist   Results are in her chart  These appear to be a workup for infertility  She was counseled that her high AMH level meant she would not be able to get pregnant  This sounds to have been a difficult experience for her  Dr Byron Mcdonald her PCP, by whom she has been cared for for the past 25 years, will be retiring and she will also no longer have a PCP soon  I reviewed with her the results of the CMP, A1C, and TSH which are normal   Reviewed that the others are typically not labs that I order (rather these are ordered and interpreted classically by a reproductive endocrinologist as part of an infertility workup) though I note her high AMH level  I offered a referral to a [facility] physician Dr Jose A Ramos, which she accepted and is instructed to call to set this up  Kwasi lives in [facility] but has not had much success finding a provider with whom she is comfortable locally  Also offered to connect her with [facility] office which is located in [place]  Explained that I was deferring the interpretation and management of her labs to PATRICIA but that I could certainly facilitate her getting there  Encouraged her to reach out with any other questions or concerns along the way  To continue prenatal vitamins  Would be OK to continue Zoloft during pregnancy

## 2022-02-24 NOTE — PATIENT INSTRUCTIONS
Your referral to Dr Alaina Jordan is ordered  Here is her contact information:  [email or web address]    [facility] is located at: [address] [facility] [address]  The number is [phone]     [email or web address]    Please do not hesitate to reach out with any further questions or concerns

## 2022-04-26 ENCOUNTER — HOSPITAL ENCOUNTER (OUTPATIENT)
Dept: RADIOLOGY | Facility: HOSPITAL | Age: 32
Discharge: HOME | End: 2022-04-26
Attending: OBSTETRICS & GYNECOLOGY | Admitting: RADIOLOGY
Payer: OTHER GOVERNMENT

## 2022-04-26 DIAGNOSIS — Z31.41 ENCOUNTER FOR FERTILITY TESTING: ICD-10-CM

## 2022-04-26 PROCEDURE — 74740 X-RAY FEMALE GENITAL TRACT: CPT

## 2022-04-26 PROCEDURE — 58340 CATHETER FOR HYSTEROGRAPHY: CPT

## 2022-04-26 RX ADMIN — IOHEXOL 3 ML: 240 INJECTION, SOLUTION INTRATHECAL; INTRAVASCULAR; INTRAVENOUS; ORAL at 13:21

## 2023-03-14 ENCOUNTER — OFFICE VISIT (OUTPATIENT)
Dept: FAMILY MEDICINE CLINIC | Facility: MEDICAL CENTER | Age: 33
End: 2023-03-14

## 2023-03-14 VITALS
OXYGEN SATURATION: 98 % | BODY MASS INDEX: 29.63 KG/M2 | WEIGHT: 161 LBS | SYSTOLIC BLOOD PRESSURE: 136 MMHG | TEMPERATURE: 97.6 F | HEIGHT: 62 IN | HEART RATE: 88 BPM | RESPIRATION RATE: 16 BRPM | DIASTOLIC BLOOD PRESSURE: 74 MMHG

## 2023-03-14 DIAGNOSIS — Z11.59 NEED FOR HEPATITIS C SCREENING TEST: ICD-10-CM

## 2023-03-14 DIAGNOSIS — Z11.4 SCREENING FOR HIV (HUMAN IMMUNODEFICIENCY VIRUS): ICD-10-CM

## 2023-03-14 DIAGNOSIS — Z13.220 SCREENING CHOLESTEROL LEVEL: ICD-10-CM

## 2023-03-14 DIAGNOSIS — Z00.00 ANNUAL PHYSICAL EXAM: Primary | ICD-10-CM

## 2023-03-14 DIAGNOSIS — Z13.1 SCREENING FOR DIABETES MELLITUS (DM): ICD-10-CM

## 2023-03-14 PROBLEM — E28.2 PCOS (POLYCYSTIC OVARIAN SYNDROME): Status: ACTIVE | Noted: 2023-03-14

## 2023-03-14 RX ORDER — ACETAMINOPHEN 160 MG
TABLET,DISINTEGRATING ORAL
COMMUNITY
Start: 2022-04-18

## 2023-03-14 RX ORDER — CRANBERRY FRUIT EXTRACT 650 MG
CAPSULE ORAL
COMMUNITY
Start: 2021-02-20

## 2023-03-14 NOTE — PROGRESS NOTES
[facility] HEALTH MAINTENANCE OFFICE VISIT  [facility] [place]    NAME: Moo Houser  AGE: 28 y o  SEX: female  : 1990     DATE: 3/14/2023    Assessment and Plan     1  Annual physical exam    2  Need for hepatitis C screening test  -     Hepatitis C Antibody ([facility]); Future    3  Screening for HIV (human immunodeficiency virus)  -     HIV 1/2 AG/AB w Reflex [facility] for 2 yr old and above; Future    4  BMI 29 0-29 9,adult    5  Screening cholesterol level  -     Lipid Panel with Direct LDL reflex; Future    6  Screening for diabetes mellitus (DM)  -     HEMOGLOBIN A1C W/ EAG ESTIMATION; Future      · Patient Counseling:   · Nutrition: Stressed importance of a well balanced diet, moderation of sodium/saturated fat, caloric balance and sufficient intake of fiber  · Exercise: Stressed the importance of regular exercise with a goal of 150 minutes per week  · Dental Health: Discussed daily flossing and brushing and regular dental visits   · Patient uses sunscreen/sun protection appropriately  · Immunizations reviewed:   · Influenza vaccine received in 2022, will obtain record  · Tetanus booster up-to-date  · Otherwise UTD  · Discussed benefits of:    · Patient will return for well woman exam, last Pap smear with cotesting 2019 normal cytology and negative HPV  · No known family history of breast cancer, ovarian cancer, or colorectal cancer  · Preventative screenings for Hep C and HIV ordered   BMI Counseling: Body mass index is 29 45 kg/m²  Discussed with patient's BMI with her  BMI Counseling: Body mass index is 29 45 kg/m²  The BMI is above normal  Nutrition recommendations include encouraging healthy choices of fruits and vegetables  Exercise recommendations include exercising 3-5 times per week  Patient referred to PCP  Rationale for BMI follow-up plan is due to patient being overweight or obese   As noted patient has balanced nutritional intake and exercises several times a week  Return for well woman exam   Also return in 1 year for annual physical and sooner as needed    Chief Complaint     Chief Complaint   Patient presents with   • Annual Exam       History of Present Illness     HPI    Well Adult Physical   Patient here for a comprehensive physical exam       Diet and Physical Activity  Diet: pescetarian   Exercise: several times per week , [employer] workouts      Depression Screen  PHQ-2/9 Depression Screening    Little interest or pleasure in doing things: 0 - not at all  Feeling down, depressed, or hopeless: 0 - not at all          General Health  Hearing: Normal:  bilateral  Vision: goes for regular eye exams and wears contacts  Dental: regular dental visits, brushes teeth once-twice daily and flosses teeth daily    Reproductive Health  -will return for well woman exam       The following portions of the patient's history were reviewed and updated as appropriate: allergies, current medications, past family history, past medical history, past social history, past surgical history and problem list     Review of Systems     Review of Systems     As noted in HPI     Past Medical History     Past Medical History:   Diagnosis Date   • Depression     started therapy a year ago; now with a psychiatrist   • Gastritis     related to OTC pain medications, diagnosed after upper endoscopy   • Recurrent UTI        Past Surgical History     Past Surgical History:   Procedure Laterality Date   • HYSTEROSCOPY  2022   • TOOTH EXTRACTION         Social History     Social History     Socioeconomic History   • Marital status: /Civil Union     Spouse name: None   • Number of children: None   • Years of education: None   • Highest education level: None   Occupational History   • None   Tobacco Use   • Smoking status: Never   • Smokeless tobacco: Never   Substance and Sexual Activity   • Alcohol use: Yes     Comment: Social   • Drug use: Never   • Sexual activity: Yes   Other Topics Concern   • None   Social History Narrative   • None     Social Determinants of Health     Financial Resource Strain: Not on file   Food Insecurity: Not on file   Transportation Needs: Not on file   Physical Activity: Not on file   Stress: Not on file   Social Connections: Not on file   Intimate Partner Violence: Not on file   Housing Stability: Not on file       Family History     Family History   Problem Relation Age of Onset   • Hypertension Mother    • Diabetes Father    • Hyperlipidemia Father        Current Medications       Current Outpatient Medications:   •  Cholecalciferol (Vitamin D3) 50 MCG (2000 UT) capsule, , Disp: , Rfl:   •  Coenzyme Q10 (CoQ-10) 100 MG CAPS, , Disp: , Rfl:   •  Cranberry (TheraCran HP) 180 MG CAPS, , Disp: , Rfl:   •  NON FORMULARY, , Disp: , Rfl:   •  Prenatal Vit-DSS-Fe Cbn-FA (PRENATAL AD PO), Take by mouth, Disp: , Rfl:      Allergies     No Known Allergies    Objective     /74 (BP Location: Left arm, Patient Position: Sitting, Cuff Size: Adult)   Pulse 88   Temp 97 6 °F (36 4 °C)   Resp 16   Ht 5' 2" (1 575 m)   Wt 73 kg (161 lb)   SpO2 98%   BMI 29 45 kg/m²      Physical Exam  Vitals reviewed  Constitutional:       General: She is not in acute distress  Appearance: Normal appearance  HENT:      Head: Normocephalic and atraumatic  Right Ear: External ear normal       Left Ear: External ear normal       Nose: Nose normal       Mouth/Throat:      Mouth: Mucous membranes are moist       Pharynx: Oropharynx is clear  Eyes:      Extraocular Movements: Extraocular movements intact  Conjunctiva/sclera: Conjunctivae normal       Pupils: Pupils are equal, round, and reactive to light  Cardiovascular:      Rate and Rhythm: Normal rate and regular rhythm  Pulses: Normal pulses  Heart sounds: Normal heart sounds  Pulmonary:      Effort: Pulmonary effort is normal       Breath sounds: Normal breath sounds  Abdominal:      General: Abdomen is flat  Bowel sounds are normal       Palpations: Abdomen is soft  Tenderness: There is no abdominal tenderness  Musculoskeletal:      Cervical back: Neck supple  Right lower leg: No edema  Left lower leg: No edema  Lymphadenopathy:      Cervical: No cervical adenopathy  Skin:     General: Skin is warm and dry  Capillary Refill: Capillary refill takes less than 2 seconds  Neurological:      Mental Status: She is alert and oriented to person, place, and time  Psychiatric:         Mood and Affect: Mood normal          Behavior: Behavior normal          Thought Content:  Thought content normal          Judgment: Judgment normal                Dwayne Durham DO  [facility] [place]

## 2023-04-01 LAB
CHOLEST SERPL-MCNC: 150 MG/DL (ref 100–199)
EST. AVERAGE GLUCOSE BLD GHB EST-MCNC: 103 MG/DL
HBA1C MFR BLD: 5.2 % (ref 4.8–5.6)
HCV AB S/CO SERPL IA: NON REACTIVE
HDLC SERPL-MCNC: 46 MG/DL
HIV 1+2 AB+HIV1 P24 AG SERPL QL IA: NON REACTIVE
LDLC SERPL CALC-MCNC: 88 MG/DL (ref 0–99)
LDLC SERPL DIRECT ASSAY-MCNC: 88 MG/DL (ref 0–99)
SL AMB VLDL CHOLESTEROL CALC: 16 MG/DL (ref 5–40)
TRIGL SERPL-MCNC: 81 MG/DL (ref 0–149)

## 2023-08-15 ENCOUNTER — TELEPHONE (OUTPATIENT)
Dept: FAMILY MEDICINE CLINIC | Facility: MEDICAL CENTER | Age: 33
End: 2023-08-15

## 2023-08-15 NOTE — TELEPHONE ENCOUNTER
Request for medical records received via fax from [facility].   Forwarded to [facility]    Routed to [facility]